# Patient Record
Sex: MALE | Race: NATIVE HAWAIIAN OR OTHER PACIFIC ISLANDER | HISPANIC OR LATINO | URBAN - METROPOLITAN AREA
[De-identification: names, ages, dates, MRNs, and addresses within clinical notes are randomized per-mention and may not be internally consistent; named-entity substitution may affect disease eponyms.]

---

## 2024-02-05 ENCOUNTER — EMERGENCY (EMERGENCY)
Facility: HOSPITAL | Age: 44
LOS: 1 days | Discharge: HOME | End: 2024-02-05
Attending: EMERGENCY MEDICINE
Payer: COMMERCIAL

## 2024-02-05 VITALS
DIASTOLIC BLOOD PRESSURE: 85 MMHG | HEIGHT: 66.14 IN | HEART RATE: 60 BPM | TEMPERATURE: 98 F | SYSTOLIC BLOOD PRESSURE: 126 MMHG | OXYGEN SATURATION: 97 % | RESPIRATION RATE: 18 BRPM | WEIGHT: 176.37 LBS

## 2024-02-05 PROCEDURE — 73120 X-RAY EXAM OF HAND: CPT | Mod: 26,RT

## 2024-02-05 PROCEDURE — 73120 X-RAY EXAM OF HAND: CPT

## 2024-02-05 PROCEDURE — 99283 EMERGENCY DEPT VISIT LOW MDM: CPT | Mod: 25

## 2024-02-05 PROCEDURE — 99284 EMERGENCY DEPT VISIT MOD MDM: CPT | Mod: 57

## 2024-02-05 PROCEDURE — 26750 TREAT FINGER FRACTURE EACH: CPT | Mod: 54,F6

## 2024-02-05 RX ORDER — ACETAMINOPHEN 500 MG
650 TABLET ORAL ONCE
Refills: 0 | Status: COMPLETED | OUTPATIENT
Start: 2024-02-05 | End: 2024-02-05

## 2024-02-05 RX ADMIN — Medication 650 MILLIGRAM(S): at 19:36

## 2024-02-05 NOTE — ED ADULT NURSE NOTE - OBJECTIVE STATEMENT
42 y/o Axxo4 M arrived from home complaining of R pointer finger pain status post shutting it in car door. Pt able to move finger, however discomfort with movement. pt otherwise asymptomatic, ambulates independently.

## 2024-02-05 NOTE — ED PROVIDER NOTE - NSFOLLOWUPINSTRUCTIONS_ED_ALL_ED_FT
Today you were evaluated at [facility] for finger pain.    You have a fracture of the 2nd digit of your right hand, the fracture is at the distal aspect of the finger. Fractures can take anywhere from 4-6 weeks to heal.     We recommend the following management:  -Immobilization - keep finger splint on until you are able to follow up with an Orthopedic physcian  -Pain control: you can take Tylenol 650mg every 6 hours as needed and ibuprofen 400mg every 8 hours as needed for pain    X-ray read: There is an acute fracture at the dorsal base of the second distal phalanx. There is 0.3 cm of dorsal displacement of the fracture fragment at the base.    While you were here we applied a finger splint, please keep this splint on for the next 3-4 weeks, take the splint off while you shower.     We want you to follow up with an orthopedic physician. We will provide the address and phone number of a clinic that you can follow up with.     Reasons to return to ED:  -loss of sensation of finger  -inability to flex/extend your finger Today you were evaluated at [facility] for finger pain.    You have a fracture of the 2nd digit of your right hand, the fracture is at the distal aspect of the finger. Fractures can take anywhere from 4-6 weeks to heal.     We recommend the following management:  -Immobilization - keep finger splint on until you are able to follow up with an Orthopedic physcian  -Pain control: you can take Tylenol 650mg every 6 hours as needed and ibuprofen 400mg every 8 hours as needed for pain    X-ray read: There is an acute fracture at the dorsal base of the second distal phalanx. There is 0.3 cm of dorsal displacement of the fracture fragment at the base.    While you were here we applied a finger splint, please keep this splint on for the next 3-4 weeks, take the splint off while you shower.     We want you to follow up with an orthopedic physician. We will provide the address and phone number of a clinic that you can follow up with.   Emilia Martel MD, MPH  Surgery of the Hand, Orthopaedic Surgery  [phone]  [address] [place] [address], [address] [address]    Reasons to return to ED:  -loss of sensation of finger  -inability to flex/extend your finger

## 2024-02-05 NOTE — ED ADULT TRIAGE NOTE - CHIEF COMPLAINT QUOTE
Pt slammed R hand in car door last week. Now c/o redness, swelling, and pain to R index finger nailbed

## 2024-02-05 NOTE — ED PROVIDER NOTE - PATIENT PORTAL LINK FT
You can access the FollowMyHealth Patient Portal offered by [facility] by registering at the following website: [email or web address]. By joining [facility] FollowMyHealth portal, you will also be able to view your health information using other applications (apps) compatible with our system.

## 2024-02-05 NOTE — ED PROVIDER NOTE - OBJECTIVE STATEMENT
43-year-old male with no significant past medical history presents for finger pain.  About 8 days ago the patient slammed the car door on his right second digit.  Since then he has been applying cream, not been taking any over-the-counter medication.  Patient endorses pain with finger flexion and extension, worsening pain over the distal aspect of his second digit.  Denies numbness and tingling in the affected area.

## 2024-02-05 NOTE — ED PROVIDER NOTE - ATTENDING CONTRIBUTION TO CARE
Attending MD Choi: I personally have seen and examined this patient.  Resident note reviewed and agree on plan of care and except where noted.  See below for details.     Seen in [facility], interviewed in [place]    43M with no reported contributory PMH/PSH/Meds, no known drug allergies presents to the ED with R index finger pain.  Reports that 8 days ago he slammed his R index finger in a car door.  Reports that since then he has been massaging finger and it has improved in both size and color.  Reports presents today because is still having pain at distal aspect of R index finger, indicates DIP.  Denies taking OTC analgesic.  Reports has pain at DIP with flexion and extension, worse at dorsum.  Denies numbness, weakness of finger.  Denies break in skin.  R hand dominant.    Exam:   General: NAD  HENT: head NCAT, airway patent   Chest: symmetric chest rise, no increased work of breathing  MSK: ranging neck freely, FROM at R index finger including at MCP, PIP and DIP, cap refill <2s,+ecchymosis overlying DIP with edema, +tenderness at DIP  Neuro: moving all extremities spontaneously, sensory grossly intact, no gross neuro deficits  Psych: normal mood and affect     A/P: 43M with pain at R DIP, will eval for fracture with XRay, will give analgesic

## 2024-02-05 NOTE — ED PROVIDER NOTE - PROGRESS NOTE DETAILS
Attending MD Choi: XRay with "an acute fracture at the dorsal base of the second distal phalanx. There is 0.3 cm of dorsal displacement of the fracture fragment at the base. "  Will splint R index finger.

## 2024-02-05 NOTE — ED PROVIDER NOTE - PHYSICAL EXAMINATION
Physical Exam:  Gen: NAD, AOx3, non-toxic appearing, able to ambulate without assistance  Head: NCAT  HEENT: EOMI, PEERLA, normal conjunctiva, tongue midline, oral mucosa moist  Lung: CTAB, no respiratory distress, no wheezes/rhonchi/rales B/L, speaking in full sentences  CV: RRR, no murmurs, rubs or gallops  Abd: soft, NT, ND, no guarding, no rigidity, no rebound tenderness, no CVA tenderness   MSK: TTP R hand 2nd digit DIP, no tenderness over nailbed, patient pain with DIP flexion and extension, however, exhibits full ROM  Neuro: No focal sensory or motor deficits  Skin: Warm, well perfused, no rash, no leg swelling  Psych: normal affect, calm

## 2024-02-05 NOTE — ED PROVIDER NOTE - CLINICAL SUMMARY MEDICAL DECISION MAKING FREE TEXT BOX
43-year-old male with no significant past medical history presents for finger pain.  About 8 days ago the patient slammed the car door on his right second digit. Since then he has been applying cream, not been taking any over-the-counter medication. HD stable on arrival. PE significant for TTP over R hand 2nd digit DIP, no TTP over nailbed, no nailbed ecchymosis, swelling at DIP joint, pain with ROM of finger, but full ROM exhibited, No warmth of hand, streaking erythema. Differential includes DIP fracture, extensor tendon injury (less likely), no concern for cellulitis, tenosynovitis as this time given no pain with ROM. Will order x-ray and pain control. 43-year-old male with no significant past medical history presents for finger pain.  About 8 days ago the patient slammed the car door on his right second digit. Since then he has been applying cream, not been taking any over-the-counter medication. R hand dominant. HD stable on arrival. PE significant for TTP over R hand 2nd digit DIP, no TTP over nailbed, no nailbed ecchymosis, swelling at DIP joint, pain with ROM of finger, but full ROM exhibited, No warmth of hand, streaking erythema. Differential includes DIP fracture, extensor tendon injury (less likely), no concern for cellulitis, tenosynovitis as this time given no pain with ROM. Will order x-ray and pain control.

## 2025-07-22 NOTE — ED ADULT TRIAGE NOTE - WEIGHT IN KG
"History Of Present Illness  oHa Arango is a 58 y.o. female presenting for surveillance colonoscopy.      Past Medical History  Medical History[1]  Surgical History  Surgical History[2]  Social History  She reports that she has never smoked. She has never used smokeless tobacco. She reports that she does not drink alcohol and does not use drugs.    Family History  Family History[3]     Allergies  Allergies[4]  Review of Systems  Pre-sedation Evaluation:  ASA Classification - ASA 2 - Patient with mild systemic disease with no functional limitations  Mallampati Score - II (hard and soft palate, upper portion of tonsils and uvula visible)    Physical Exam  Vitals reviewed.     Eyes:      General: No scleral icterus.      Cardiovascular:      Rate and Rhythm: Normal rate and regular rhythm.      Pulses: Normal pulses.   Pulmonary:      Effort: Pulmonary effort is normal.   Abdominal:      General: Abdomen is flat. There is no distension.      Palpations: Abdomen is soft.      Tenderness: There is no abdominal tenderness.     Skin:     Capillary Refill: Capillary refill takes less than 2 seconds.     Neurological:      General: No focal deficit present.      Mental Status: She is alert and oriented to person, place, and time.     Psychiatric:         Mood and Affect: Mood normal.         Thought Content: Thought content normal.          Last Recorded Vitals  Blood pressure 125/75, pulse 56, temperature 36.4 °C (97.5 °F), temperature source Temporal, resp. rate 12, height 1.6 m (5' 3\"), weight 77 kg (169 lb 12.1 oz), SpO2 99%.     Assessment/Plan   Will proceed with colonoscopy      PTA/Current Medications:  Prescriptions Prior to Admission[5]  Current Medications[6]  Hay Rangel MD       [1]   Past Medical History:  Diagnosis Date    HLD (hyperlipidemia)    [2]   Past Surgical History:  Procedure Laterality Date    BREAST BIOPSY Left     benign core    HYSTERECTOMY      JOINT REPLACEMENT Left     knee   [3] "   Family History  Problem Relation Name Age of Onset    Heart disease Mother Lulú Loera    [4] No Known Allergies  [5] (Not in a hospital admission)  [6]   Current Outpatient Medications   Medication Sig Dispense Refill    atorvastatin (Lipitor) 80 mg tablet Take 1 tablet (80 mg) by mouth once daily. 90 tablet 1    estradiol (Estrace) 0.01 % (0.1 mg/gram) vaginal cream Insert 0.5 Applicatorfuls (2 g) into the vagina once daily at bedtime. At bedtime for 2 weeks, then at bedtime twice a week. 34 g 3    triamcinolone (Kenalog) 0.1 % ointment Apply topically 2 times a day as needed for irritation. 15 g 1     No current facility-administered medications for this encounter.      82